# Patient Record
Sex: FEMALE | Race: WHITE | NOT HISPANIC OR LATINO | Employment: UNEMPLOYED | URBAN - METROPOLITAN AREA
[De-identification: names, ages, dates, MRNs, and addresses within clinical notes are randomized per-mention and may not be internally consistent; named-entity substitution may affect disease eponyms.]

---

## 2024-03-15 ENCOUNTER — TELEPHONE (OUTPATIENT)
Age: 59
End: 2024-03-15

## 2024-03-15 NOTE — TELEPHONE ENCOUNTER
Received call from patient asking to keron a new patient appt for a full body scan.    Wait/cancellation process was explained.    Patient declined to being put on the list.    She was advised to call her insurance to get information on other derms within network, pcp,urgent care, or ED.    Patient verbalized understanding.